# Patient Record
Sex: FEMALE | Race: WHITE | NOT HISPANIC OR LATINO | Employment: FULL TIME | URBAN - METROPOLITAN AREA
[De-identification: names, ages, dates, MRNs, and addresses within clinical notes are randomized per-mention and may not be internally consistent; named-entity substitution may affect disease eponyms.]

---

## 2023-02-21 LAB — ESTRADIOL (PG/ML) IN SER/PLAS: 31 PG/ML

## 2023-02-26 LAB — ESTRADIOL (PG/ML) IN SER/PLAS: 104 PG/ML

## 2023-02-28 LAB — ESTRADIOL (PG/ML) IN SER/PLAS: 164 PG/ML

## 2023-03-02 LAB
ESTRADIOL (PG/ML) IN SER/PLAS: 388 PG/ML
PROGESTERONE (NG/ML) IN SER/PLAS: 0.6 NG/ML

## 2023-03-04 LAB
ESTRADIOL (PG/ML) IN SER/PLAS: 1006 PG/ML
PROGESTERONE (NG/ML) IN SER/PLAS: 0.3 NG/ML

## 2023-03-06 LAB
ESTRADIOL (PG/ML) IN SER/PLAS: 1473 PG/ML
PROGESTERONE (NG/ML) IN SER/PLAS: 0.6 NG/ML

## 2023-03-08 LAB
ESTRADIOL (PG/ML) IN SER/PLAS: 2915 PG/ML
PROGESTERONE (NG/ML) IN SER/PLAS: 0.6 NG/ML

## 2023-03-09 LAB
LUTEINIZING HORMONE (IU/ML) IN SER/PLAS: 43.8 IU/L
PROGESTERONE (NG/ML) IN SER/PLAS: 2.7 NG/ML

## 2023-03-28 LAB
ESTRADIOL (PG/ML) IN SER/PLAS: 278 PG/ML
PROGESTERONE (NG/ML) IN SER/PLAS: 0.3 NG/ML

## 2023-04-04 LAB — PROGESTERONE (NG/ML) IN SER/PLAS: 27.7 NG/ML

## 2023-04-14 LAB — CHORIOGONADOTROPIN (MIU/ML) IN SER/PLAS: 84 MIU/ML

## 2023-04-18 LAB — CHORIOGONADOTROPIN (MIU/ML) IN SER/PLAS: 454 MIU/ML

## 2023-04-25 LAB — CHORIOGONADOTROPIN (MIU/ML) IN SER/PLAS: 4069 MIU/ML

## 2023-05-17 LAB
ABO GROUP (TYPE) IN BLOOD: NORMAL
ANTIBODY SCREEN: NORMAL
CHORIOGONADOTROPIN (MIU/ML) IN SER/PLAS: ABNORMAL MIU/ML
ERYTHROCYTE DISTRIBUTION WIDTH (RATIO) BY AUTOMATED COUNT: 13.6 % (ref 11.5–14.5)
ERYTHROCYTE MEAN CORPUSCULAR HEMOGLOBIN CONCENTRATION (G/DL) BY AUTOMATED: 32.4 G/DL (ref 32–36)
ERYTHROCYTE MEAN CORPUSCULAR VOLUME (FL) BY AUTOMATED COUNT: 85 FL (ref 80–100)
ERYTHROCYTES (10*6/UL) IN BLOOD BY AUTOMATED COUNT: 4.62 X10E12/L (ref 4–5.2)
HEMATOCRIT (%) IN BLOOD BY AUTOMATED COUNT: 39.2 % (ref 36–46)
HEMOGLOBIN (G/DL) IN BLOOD: 12.7 G/DL (ref 12–16)
LEUKOCYTES (10*3/UL) IN BLOOD BY AUTOMATED COUNT: 13.8 X10E9/L (ref 4.4–11.3)
PLATELETS (10*3/UL) IN BLOOD AUTOMATED COUNT: 326 X10E9/L (ref 150–450)
RH FACTOR: NORMAL

## 2023-05-25 LAB — CHORIOGONADOTROPIN (MIU/ML) IN SER/PLAS: 292 IU/L

## 2023-07-25 LAB
ESTRADIOL (PG/ML) IN SER/PLAS: 99 PG/ML
HEPATITIS B VIRUS SURFACE AG PRESENCE IN SERUM: NONREACTIVE
HEPATITIS C VIRUS AB PRESENCE IN SERUM: NONREACTIVE
HIV 1/ 2 AG/AB SCREEN: NONREACTIVE
LUTEINIZING HORMONE (IU/ML) IN SER/PLAS: 5.9 IU/L
PROGESTERONE (NG/ML) IN SER/PLAS: 0.5 NG/ML
SYPHILIS TOTAL AB: NONREACTIVE

## 2023-07-26 LAB
CHLAMYDIA TRACH., AMPLIFIED: NEGATIVE
N. GONORRHEA, AMPLIFIED: NEGATIVE

## 2023-08-24 LAB
ESTRADIOL (PG/ML) IN SER/PLAS: 168 PG/ML
LUTEINIZING HORMONE (IU/ML) IN SER/PLAS: 14.4 IU/L
PROGESTERONE (NG/ML) IN SER/PLAS: 0.7 NG/ML

## 2023-10-23 PROBLEM — O09.519 ADVANCED MATERNAL AGE, PRIMIGRAVIDA (HHS-HCC): Status: ACTIVE | Noted: 2023-10-23

## 2023-10-23 PROBLEM — E66.812 CLASS 2 OBESITY WITH BODY MASS INDEX (BMI) OF 35.0 TO 35.9 IN ADULT: Status: ACTIVE | Noted: 2023-10-23

## 2023-10-23 PROBLEM — E28.2 PCOS (POLYCYSTIC OVARIAN SYNDROME): Status: ACTIVE | Noted: 2023-10-23

## 2023-10-23 PROBLEM — N92.6 IRREGULAR MENSES: Status: ACTIVE | Noted: 2023-10-23

## 2023-10-23 PROBLEM — N97.9 FEMALE INFERTILITY: Status: ACTIVE | Noted: 2023-10-23

## 2023-10-23 PROBLEM — N80.9 ENDOMETRIOSIS: Status: ACTIVE | Noted: 2023-10-23

## 2023-10-23 PROBLEM — E66.9 CLASS 2 OBESITY WITH BODY MASS INDEX (BMI) OF 35.0 TO 35.9 IN ADULT: Status: ACTIVE | Noted: 2023-10-23

## 2023-10-23 PROBLEM — E11.9 DIABETES (MULTI): Status: ACTIVE | Noted: 2023-10-23

## 2023-10-23 PROBLEM — R73.09 ELEVATED HEMOGLOBIN A1C: Status: ACTIVE | Noted: 2023-10-23

## 2023-10-23 PROBLEM — I10 CHRONIC HYPERTENSION: Status: ACTIVE | Noted: 2023-10-23

## 2023-10-23 RX ORDER — PNV 119/IRON FUM/FOLIC ACID 29 MG-1 MG
TABLET ORAL
COMMUNITY
Start: 2022-08-01

## 2023-10-23 RX ORDER — MEGESTROL ACETATE 40 MG/1
TABLET ORAL
COMMUNITY
Start: 2022-08-01 | End: 2024-04-09 | Stop reason: ALTCHOICE

## 2023-10-23 RX ORDER — VITAMIN E (DL,TOCOPHERYL ACET) 90 MG
CAPSULE ORAL
COMMUNITY
Start: 2022-08-01

## 2023-10-23 RX ORDER — VIT C/E/ZN/COPPR/LUTEIN/ZEAXAN 250MG-90MG
CAPSULE ORAL
COMMUNITY
Start: 2022-08-01

## 2023-10-23 RX ORDER — LORATADINE AND PSEUDOEPHEDRINE SULFATE 5; 120 MG/1; MG/1
TABLET, EXTENDED RELEASE ORAL
COMMUNITY
Start: 2022-08-01

## 2023-10-23 RX ORDER — NORETHINDRONE 5 MG/1
1 TABLET ORAL DAILY
COMMUNITY
Start: 2022-10-06

## 2023-10-23 RX ORDER — METFORMIN HYDROCHLORIDE 750 MG/1
1 TABLET, EXTENDED RELEASE ORAL 2 TIMES DAILY
COMMUNITY
Start: 2022-02-07

## 2023-10-23 RX ORDER — ALBUTEROL SULFATE 90 UG/1
1-2 AEROSOL, METERED RESPIRATORY (INHALATION)
COMMUNITY
Start: 2022-08-01

## 2023-10-23 RX ORDER — NORGESTIMATE AND ETHINYL ESTRADIOL 0.25-0.035
KIT ORAL
COMMUNITY
Start: 2022-09-07 | End: 2024-04-09 | Stop reason: ALTCHOICE

## 2023-10-23 RX ORDER — PROGESTERONE 100 MG/1
100 CAPSULE ORAL 2 TIMES DAILY
COMMUNITY
End: 2024-04-09 | Stop reason: ALTCHOICE

## 2023-10-23 RX ORDER — DEXTROMETHORPHAN HYDROBROMIDE, GUAIFENESIN 5; 100 MG/5ML; MG/5ML
LIQUID ORAL
COMMUNITY
Start: 2022-10-06

## 2023-10-23 RX ORDER — LETROZOLE 2.5 MG/1
2.5 TABLET, FILM COATED ORAL DAILY
COMMUNITY
Start: 2022-02-07 | End: 2024-04-09 | Stop reason: ALTCHOICE

## 2023-10-23 RX ORDER — PHENYLEPHRINE HCL 10 MG
TABLET ORAL
COMMUNITY
Start: 2022-08-01

## 2023-10-23 RX ORDER — SPIRONOLACTONE 50 MG/1
TABLET, FILM COATED ORAL
COMMUNITY
Start: 2021-10-05 | End: 2024-04-09 | Stop reason: ALTCHOICE

## 2023-10-25 ENCOUNTER — APPOINTMENT (OUTPATIENT)
Dept: INTEGRATIVE MEDICINE | Facility: CLINIC | Age: 38
End: 2023-10-25

## 2023-11-01 ENCOUNTER — APPOINTMENT (OUTPATIENT)
Dept: INTEGRATIVE MEDICINE | Facility: CLINIC | Age: 38
End: 2023-11-01

## 2023-11-08 ENCOUNTER — APPOINTMENT (OUTPATIENT)
Dept: INTEGRATIVE MEDICINE | Facility: CLINIC | Age: 38
End: 2023-11-08

## 2024-04-09 ENCOUNTER — TELEPHONE (OUTPATIENT)
Dept: ENDOCRINOLOGY | Facility: CLINIC | Age: 39
End: 2024-04-09

## 2024-04-09 ENCOUNTER — TELEMEDICINE (OUTPATIENT)
Dept: ENDOCRINOLOGY | Facility: CLINIC | Age: 39
End: 2024-04-09
Payer: COMMERCIAL

## 2024-04-09 VITALS — WEIGHT: 243 LBS | HEIGHT: 67 IN | BODY MASS INDEX: 38.14 KG/M2

## 2024-04-09 DIAGNOSIS — Z13.29 SCREENING FOR THYROID DISORDER: ICD-10-CM

## 2024-04-09 DIAGNOSIS — N93.9 ABNORMAL UTERINE BLEEDING (AUB): ICD-10-CM

## 2024-04-09 DIAGNOSIS — E28.2 PCOS (POLYCYSTIC OVARIAN SYNDROME): ICD-10-CM

## 2024-04-09 DIAGNOSIS — N97.0 INFERTILITY, ANOVULATION: Primary | ICD-10-CM

## 2024-04-09 DIAGNOSIS — Z01.83 ENCOUNTER FOR RH BLOOD TYPING: ICD-10-CM

## 2024-04-09 DIAGNOSIS — Z11.3 SCREENING FOR STDS (SEXUALLY TRANSMITTED DISEASES): ICD-10-CM

## 2024-04-09 DIAGNOSIS — E11.9 TYPE 2 DIABETES MELLITUS WITHOUT COMPLICATION, WITHOUT LONG-TERM CURRENT USE OF INSULIN (MULTI): ICD-10-CM

## 2024-04-09 DIAGNOSIS — Z31.41 FERTILITY TESTING: ICD-10-CM

## 2024-04-09 PROCEDURE — 99214 OFFICE O/P EST MOD 30 MIN: CPT

## 2024-04-09 PROCEDURE — 1036F TOBACCO NON-USER: CPT

## 2024-04-09 RX ORDER — TOPIRAMATE SPINKLE 25 MG/1
25 CAPSULE ORAL 2 TIMES DAILY
COMMUNITY

## 2024-04-09 ASSESSMENT — PATIENT HEALTH QUESTIONNAIRE - PHQ9
SUM OF ALL RESPONSES TO PHQ9 QUESTIONS 1 AND 2: 0
2. FEELING DOWN, DEPRESSED OR HOPELESS: NOT AT ALL
1. LITTLE INTEREST OR PLEASURE IN DOING THINGS: NOT AT ALL

## 2024-04-09 ASSESSMENT — PAIN SCALES - GENERAL: PAINLEVEL: 5

## 2024-04-09 NOTE — TELEPHONE ENCOUNTER
Reason for call: Order Needed  Notes: Pt had a virtual visit with Kathie today and called to schedule pelvic US per her instruction. There is no order placed. Pt would ideally like to come in tomorrow.

## 2024-04-09 NOTE — PROGRESS NOTES
Virtual or Telephone Consent: An interactive audio and video telecommunication system which permits real time communications between the patient (at the originating site) and provider (at the distant site) was utilized to provide this telehealth service    FERTILITY PATIENT FOLLOW UP VISIT    Accompanied today by:      Danyelle Cano is a 38 y.o.  female who presents to discuss treatment options, H/o endometriosis, PCOS, AUB, Fibroid uterus, & Type 2 DM, S/P IVF 3-2023 with 1 blast frozen/FET 2023 resulting in miscarriage/IPAS/Trisomy 16 on 2023 followed by 2 unsuccessful cycles of Letrozole 5 mg/Monitoring/HCG Trigger/IUI with LP P4 support  & . Recent diagnosis of Migraines with Aura. Partner with male factor infertility: oligospermia & pyospermia, last saw Dr. RAMOS 2023.    PRIOR EVALUATION / TREATMENT  Letrozole 5 mg days 3-7/monitoring/HCG Trigger/IUI with LP P4 support x 2023 & 2023  Programmed FET Estrace 6 mg daily/CASSIE 75 mg IM- trilaminar 10.4 mm: 2023 1 blast- miscarriage with IPAS in IVF MS on 2023- trisomy 16 of maternal origin   IVF x 1: //[facility]- Egg Retrieval 3-: 3 eggs retrieved left ovary, 0 egg from right ovary- 1 blast frozen  MFM consult 2023- cleared by Dr. Doe  SIS 2022- normal    PRIOR EVALUATION / TREATMENT  Full Fertility work up at [facility] in 2018 (per pt, no records for review today)  2 cycles of Letrozole 5 mg with u/s monitoring and hcg trigger with partner IUI at [facility], without conception   Hycosy (SIS bubble test) per pt WNL, bilateral tubal patency  D&C 2022 for abnormal uterine bleeding, polypectomy at time of D&C, endometrial biopsy normal- Treated with Megace 40 mg daily for AUB in     Prior Labs  Lab Results    Date Done      AMH: 2.15 (Ref range: ng/mL) 2022   TSH: 1.75 (Ref range: 0.44 - 3.98 mIU/L) 2022   PRL: 15.0 (Ref range: 3.0 - 20.0 ug/L) 2022   Testosterone: No results  found for requested labs within last 1825 days. No results found for requested labs within last 1825 days.   DHEAS: 55 (Ref range: 12 - 379 ug/dL) 2022   FSH: No results found for requested labs within last 1825 days. No results found for requested labs within last 1825 days.   17 OHP: No results found for requested labs within last 1825 days. No results found for requested labs within last 1825 days.   HgbA1c: 6.1 with PCP [facility] - will fax me result   Hepatitis B surface antigen: NONREACTIVE (Ref range: NONREACTIVE) 2023   Hepatitis C antibody: NONREACTIVE (Ref range: NONREACTIVE) 2023   HIV ½ Antigen Antibody screen with reflex: NONREACTIVE (Ref range: NONREACTIVE) 2023   Syphilis screening with reflex: No results found for requested labs within last 1825 days. 2023   GC: NEGATIVE (Ref range: Negative) 2023   CT: NEGATIVE (Ref range: Negative) 2023   Type and Screen: O 2023   Rh: NEG 2023   Antibody: negative 2023   Rubella: POSITIVE (Ref range: ) 2022   Varicella: POSITIVE (Ref range: NEGATIVE) 2022   Hemoglobin: No results found for requested labs within last 1825 days. No results found for requested labs within last 1825 days.   Hematocrit: No results found for requested labs within last 1825 days. No results found for requested labs within last 1825 days.   Creatinine: 0.75 (Ref range: 0.50 - 1.05 mg/dL) 2023   AST:12 (Ref range: 9 - 39 U/L) 2023   ALT:13 (Ref range: 7 - 45 U/L): 2023      Relationship Status:       OB Hx:   SAB from 1 Blast FET with IPAS on 2023 with Trisomy 16 with Anora testing     OB History          1    Para   0    Term   0       0    AB   0    Living   0         SAB   0    IAB   0    Ectopic   0    Multiple   0    Live Births   0               GYN HISTORY   Have you ever been diagnosed with a sexually transmitted disease?  no  Please select all that are applicable:   "  Have you ever had Pelvic Inflammatory Disease?  no  Have you had an abnormal PAP smear?  no  Date & Result of last PAP smear: 6-7-2021- negative- result scanned in  Have you ever had an abnormal Mammogram?  no  Date & result of your last mammogram:  NEEDS MAMMOGRAM- order is in and she will do  Do you have pelvic pain?  yes, worse- daily- with intermenstrual bleeding- HMB and clots  How many times per week do you have intercourse?  NA  Do you have pain with intercourse?  Yes- deep penetration  Do you use lubricants with intercourse?  -  Do you have pain with bowel movements?    Yes, occ          Do you have pain with a full bladder?  Yes, occ  MENSTRUAL HISTORY  LMP:  4-2-2024  Menarche:  12  Contraception:  COCPs age [age]  Cycle length:  off & on bleeding past couple of months   Describe your bleeding:  heavy  Dysmenorrhea:  yes, first few days of menses pt described \"bad cramps\" uses heating pads and ibuprofen      ENDOCRINE/INFERTILITY HISTORY  Duration of infertility:  6.5 yrs  Coital Activity/week:  NA  Nipple Discharge:  no  Vision changes:  with onset migraine  Headaches:  migraine HA's  Excess hair growth:  yes- increased on chin  Excessive hair loss:  no  Acne:  yes  Oily skin:  yes  Recent weight change  Weight gain:  no  Weight loss:  no  Exercise more than 3 times a week:  yes    PMH: PMH: Asthma, sinusitis (allergy related), PCOS, Type 2 DM, endometriosis, CHTN, fibroids, chronic HA's/migraines HA's with Aura  Past Medical History:   Diagnosis Date    Migraine headache with aura     Other seasonal allergic rhinitis     Seasonal allergies    Personal history of other benign neoplasm     History of uterine leiomyoma    Personal history of other diseases of the female genital tract     History of endometriosis    Personal history of other diseases of the respiratory system     History of chronic rhinitis    Personal history of other diseases of the respiratory system 10/06/2022    History of asthma    " Polyp of corpus uteri     Endometrial polyp        MEDICATIONS  Current Outpatient Medications on File Prior to Visit   Medication Sig Dispense Refill    acetaminophen (Tylenol Arthritis Pain) 650 mg ER tablet Tylenol 8 Hour 650 MG Oral Tablet Extended Release   Refills: 0        Start : 6-Oct-2022   Active      albuterol 90 mcg/actuation inhaler Inhale 1-2 puffs.  INHALE 1 TO 2 PUFFS BY MOUTH EVERY 4 TO 6 HOURS AS NEEDED      cholecalciferol (Vitamin D-3) 25 MCG (1000 UT) capsule Vitamin D-3 25 MCG (1000 UT) Oral Capsule   Refills: 0        Start : 1-Aug-2022   Active      Cinnamon 500 mg capsule Cinnamon 500 MG Oral Capsule   Refills: 0        Start : 1-Aug-2022   Active      coenzyme Q10 400 mg capsule CoQ10 400 MG Oral Capsule   Refills: 0        Start : 1-Aug-2022   Active      loratadine-pseudoephedrine (Claritin-D 12 Hour) 5-120 mg 12 hr tablet Claritin-D 12 Hour 5-120 MG Oral Tablet Extended Release 12 Hour   Refills: 0        Start : 1-Aug-2022   Active      metFORMIN XR (Glucophage-XR) 750 mg 24 hr tablet Take 1 tablet (750 mg) by mouth 2 times a day.      norethindrone (Aygestin) 5 mg tablet Take 1 tablet (5 mg) by mouth once daily.      -iron fum-folic acid (Prenatal 19) 29 mg iron- 1 mg tablet Prenatal 19 Oral Tablet   Refills: 0        Start : 1-Aug-2022   Active      topiramate (Topamax Sprinkle) 25 mg capsule Take 1 capsule (25 mg) by mouth 2 times a day. Do not crush or chew.      [DISCONTINUED] chorionic gonadotropin (Pregnyl) 10,000 unit injection RECONSTITUTE ACCORDING TO INSTRUCTIONS AND INJECT 10,000 UNITS (1 ML) INTO THE MUSCLE AS A ONE TIME DOSE, AS DIRECTED PER PROVIDER FOR TRIGGER.. (Patient not taking: Reported on 4/9/2024) 1 each 1    [DISCONTINUED] estradiol (Estrace) 2 mg tablet TAKE THREE (3) TABLETS BY MOUTH DAILY STARTING ON CYCLE DAY 1 90 tablet 3    [DISCONTINUED] letrozole (Femara) 2.5 mg tablet Take 1 tablet (2.5 mg total) by mouth once daily.  FOR 5 DAYS STARTING ON CYCLE  DAY 2 OR 3      [DISCONTINUED] megestrol (Megace) 40 mg tablet Megestrol Acetate 40 MG Oral Tablet   Refills: 0        Start : 1-Aug-2022   Active      [DISCONTINUED] norgestimate-ethinyl estradioL (Sprintec, 28,) 0.25-35 mg-mcg tablet Sprintec 28 0.25-35 MG-MCG Oral Tablet   Quantity: 28  Refills: 0        Start : 7-Sep-2022   Active      [DISCONTINUED] progesterone (Prometrium) 100 mg capsule Insert 1 capsule (100 mg) into the vagina 2 times a day. Start 3 days after intrauterine insemination      [DISCONTINUED] spironolactone (Aldactone) 50 mg tablet Spironolactone 50 MG Oral Tablet   Quantity: 90  Refills: 0        Start : 5-Oct-2021   Active       No current facility-administered medications on file prior to visit.        PSH: D&C x 2 for abnormal uterine bleeding, Laparoscopy was diagnosed with endometriosis (Dr. Acosta in [place]), IPAS, IVF x 1  Past Surgical History:   Procedure Laterality Date    OTHER SURGICAL HISTORY  2022    Laparoscopy    OTHER SURGICAL HISTORY  2022    Hysteroscopy    OTHER SURGICAL HISTORY  2022    Dilation and curettage    OTHER SURGICAL HISTORY  2022    Tonsillectomy    OTHER SURGICAL HISTORY  2022    Nasal septoplasty      PSYCH HISTORY: none       SOCIAL HISTORY  Social History     Tobacco Use    Smoking status: Never     Passive exposure: Never    Smokeless tobacco: Never   Substance Use Topics    Alcohol use: Yes    Drug use: Never     Occupation:  RN  Have you ever been incarcerated?  no  Do you have a history of domestic violence?  no  Do you feel safe at home?  yes  Do you have a history of any negative sexual experience such as incest or rape? no      PARTNER HISTORY  Partner Name:  Sancho Cano   :  3/30/[birth year], 40 yrs old  Occupation:  sales for [employer]   Prior fertility history:  none, 1 miscarriage with this partner   PMH:  no  PSH:  no  Smoking: no  Alcohol Use:  no  Drug Use:  no  Medications:  no  Injuries:  no  STD:  negative  "2023, due repeat 2024  Please select all that are applicable:    SA:  yes RGI, overall normal per pt, ? low motility & with us for IUI & IVF  SA Results:  recent IUIs:  2023: conc 14.38, mot 48%, TMS 3.4 million  2023: mot 73%, TMS 1.6 million  Last saw Dr. RAMOS 2023- oligospermia & pyospermia    FAMILY HISTORY  Family History   Problem Relation Name Age of Onset    No Known Problems Mother      No Known Problems Father         CANCER HISTORY    Breast:  no  Ovarian:  no  Colon:  no  Endometrial:  no    FAMILY VTE HISTORY  Family History of Blood Clots:  no    GENETIC HISTORY  Ethnic Background  Patient:  Mixed    Partner:  Mixed    Genetic Disease in Family  Patient: no   Partner:  no  Birth Defects in Family  Patient:  no  Partner:  no  Genetic screening performed previously:  yes had [facility], do not have results, but remembers results being normal      BMI:   BMI Readings from Last 1 Encounters:   24 38.06 kg/m²     VITALS:  Ht 1.702 m (5' 7\")   Wt 110 kg (243 lb)   LMP 2024 (Exact Date)   BMI 38.06 kg/m²     ASSESSMENT   38 y.o.  female with h/o infertility x 6.5 yrs, suspected oligoovulation and the following pertinent medical issues: AUB, Asthma, sinusitis (allergy related), PCOS, Type 2 DM, endometriosis, CHTN, fibroids, migraines HA's with Aura. Desires IVF with PGT-A if uses own eggs vs egg donation.    Partner SA: Oligospermia/pyospermia    COUNSELING  We discussed causes of infertility including hormonal, egg quality issues, structural problems such as endometriosis, adhesions, or tubal problems, uterine factors such as polyps or fibroids, and sperm issues. Reviewed evaluation of such as well. We discussed various methods for achieving pregnancy in some detail including, ovulation induction, insemination, superovulation and IVF.    We discussed diagnosis of PCOS and implications for fertility and long-term " "health including risks of endometrial hyperplasia, obesity, diabetes and cardiovascular disease. Discussed diet and exercise are first-line treatments for PCOS. Medical management may be indicated, particularly for glucose intolerance if that is found in testing. Ovulation induction is the primary treatment for fertility.    Discussed the importance of diet and exercise as first-line treatments for PCOS and particularly the importance of a low-glycemic index diet that emphasizes whole grains, vegetables and fruits, and protein sources while minimizing sugar and processed foods. Discussed that even a small amount of weight loss can have an effect on the symptoms of PCOS and is important for long-term health outcomes.      We discussed the impact of age on fertility. We discussed that a woman is born with all of the follicles that she will have in her lifetime and that these numbers progressively decrease as the patient reaches menopause. We discussed that women can remain fertile into their late 30’s and even early 40’s, however, chance for success is significantly lower for women who have infertility. We also discussed the higher rates of aneuploidy and miscarriage that occur as women age.  We discussed AMH testing and the patient's AMH level, if applicable.  AMH is considered favorable if >1 however this test has many limitations including poor predictive rates of pregnancy. In randomized control trials such as \"Time to conceive\" pts with low AMH levels (<0.7) has similar cumulative pregnancy rates compared with women that had normal AMH levels.  In the \"AMIGOS\" study, AMH did not predict pregnancy rates following OS/IUI for unexplained infertility. However,  AMH is a marker that is helpful to predict how a patient may respond or oocyte yields with FSH and ART treatments, but again there is conflicting data about how this affects pregnancy rates with ART.    Routine Testing  Fertility Center  STDs Within 1 year "   Genetic carrier Waiver/Completed   T&S Within 1 year   AMH Within 1 year   TSH Within 1 year   Rubella/Varicella Within 5 years     BMI Testing  Fertility Center  CBC Within 1 year   CMP Within 1 year   HgbA1c Within 1 year   Mag, Phos, Vit D <18 Within 1 year   MFM > 40  REQ   Wt loss consult > 40 OPT     PLAN: PATIENT:  Schedule IVF consult With Third Party with HARLEY Garcia CNP for poss egg donor, if uses own eggs will do PGT-A  Schedule FUV with attending for AUB  Needs Mammogram- has order and will do  CBC  CMP  Lipid Panel  AMH  TSH  HgbA1C will fax result- done 2-2024- 6.1%  T&S  Declines [facility]- needs to sign waiver if has not done  Gyn Pelvic Ultrasound- will try to get this week and if not to call back next menses and schedule- get an EMB if lining thickened. H/O AUB, fibroids, & polyps- may also need SIS  Follow up with MD   Hysteroscopy if plans IVF with PGT-A/and or egg donor  STDs due 7-2023, order now  Chart to primary nurse for care coordination and patient check list/education, Tammy is your nurse  Enroll in Engaged MD  Take prenatal vitamins, vitamin D 2000 IUs daily  Discussed that PAP and mammogram must be updated if appropriate based on age and clinical history and results received before treatment can begin- up to date for pap test, needs MAMMOGRAM  Discussed that treatment cannot proceed until checklist items are complete     PARTNER:   Follow up with DR. RACHEL Sanabria SA  STDs not due until 7-        MD Completion:  Ectopic Risk: Yes- h/o endometriosis  Medically Complex: Yes- PMH: Asthma, sinusitis (allergy related), PCOS, Type 2 DM, endometriosis, CHTN, fibroids, chronic HA's/Migraines with Aura      Kathie Magdaleno CNP 04/09/24 12:56 PM

## 2024-04-09 NOTE — Clinical Note
Murali hyatt, I saw Danyelle today- will eventually do IVF with PGT-S vs egg donor, also with AUB- see my note, it is lengthy, Kathie

## 2024-04-17 ENCOUNTER — ANCILLARY PROCEDURE (OUTPATIENT)
Dept: ENDOCRINOLOGY | Facility: CLINIC | Age: 39
End: 2024-04-17
Payer: COMMERCIAL

## 2024-04-17 DIAGNOSIS — Z11.3 SCREENING FOR STDS (SEXUALLY TRANSMITTED DISEASES): ICD-10-CM

## 2024-04-17 DIAGNOSIS — Z13.29 SCREENING FOR THYROID DISORDER: ICD-10-CM

## 2024-04-17 DIAGNOSIS — E28.2 PCOS (POLYCYSTIC OVARIAN SYNDROME): ICD-10-CM

## 2024-04-17 DIAGNOSIS — N93.9 ABNORMAL UTERINE BLEEDING (AUB): ICD-10-CM

## 2024-04-17 DIAGNOSIS — Z01.83 ENCOUNTER FOR RH BLOOD TYPING: ICD-10-CM

## 2024-04-17 DIAGNOSIS — E11.9 TYPE 2 DIABETES MELLITUS WITHOUT COMPLICATION, WITHOUT LONG-TERM CURRENT USE OF INSULIN (MULTI): ICD-10-CM

## 2024-04-17 DIAGNOSIS — Z31.41 FERTILITY TESTING: ICD-10-CM

## 2024-04-17 LAB
ABO GROUP (TYPE) IN BLOOD: NORMAL
ALBUMIN SERPL BCP-MCNC: 4.1 G/DL (ref 3.4–5)
ALP SERPL-CCNC: 66 U/L (ref 33–110)
ALT SERPL W P-5'-P-CCNC: 14 U/L (ref 7–45)
ANION GAP SERPL CALC-SCNC: 13 MMOL/L (ref 10–20)
ANTIBODY SCREEN: NORMAL
AST SERPL W P-5'-P-CCNC: 10 U/L (ref 9–39)
BILIRUB SERPL-MCNC: 0.3 MG/DL (ref 0–1.2)
BUN SERPL-MCNC: 12 MG/DL (ref 6–23)
CALCIUM SERPL-MCNC: 8.7 MG/DL (ref 8.6–10.3)
CHLORIDE SERPL-SCNC: 105 MMOL/L (ref 98–107)
CHOLEST SERPL-MCNC: 150 MG/DL (ref 0–199)
CHOLESTEROL/HDL RATIO: 3.6
CO2 SERPL-SCNC: 25 MMOL/L (ref 21–32)
CREAT SERPL-MCNC: 0.75 MG/DL (ref 0.5–1.05)
EGFRCR SERPLBLD CKD-EPI 2021: >90 ML/MIN/1.73M*2
ERYTHROCYTE [DISTWIDTH] IN BLOOD BY AUTOMATED COUNT: 13.2 % (ref 11.5–14.5)
GLUCOSE SERPL-MCNC: 99 MG/DL (ref 74–99)
HCT VFR BLD AUTO: 37 % (ref 36–46)
HDLC SERPL-MCNC: 41.9 MG/DL
HGB BLD-MCNC: 11.7 G/DL (ref 12–16)
LDLC SERPL CALC-MCNC: 82 MG/DL
MCH RBC QN AUTO: 26.7 PG (ref 26–34)
MCHC RBC AUTO-ENTMCNC: 31.6 G/DL (ref 32–36)
MCV RBC AUTO: 85 FL (ref 80–100)
NON HDL CHOLESTEROL: 108 MG/DL (ref 0–149)
NRBC BLD-RTO: 0 /100 WBCS (ref 0–0)
PLATELET # BLD AUTO: 283 X10*3/UL (ref 150–450)
POTASSIUM SERPL-SCNC: 3.8 MMOL/L (ref 3.5–5.3)
PROT SERPL-MCNC: 6.7 G/DL (ref 6.4–8.2)
RBC # BLD AUTO: 4.38 X10*6/UL (ref 4–5.2)
RH FACTOR (ANTIGEN D): NORMAL
SODIUM SERPL-SCNC: 139 MMOL/L (ref 136–145)
TRIGL SERPL-MCNC: 133 MG/DL (ref 0–149)
TSH SERPL-ACNC: 1.86 MIU/L (ref 0.44–3.98)
VLDL: 27 MG/DL (ref 0–40)
WBC # BLD AUTO: 10 X10*3/UL (ref 4.4–11.3)

## 2024-04-17 PROCEDURE — 86780 TREPONEMA PALLIDUM: CPT

## 2024-04-17 PROCEDURE — 86803 HEPATITIS C AB TEST: CPT

## 2024-04-17 PROCEDURE — 76830 TRANSVAGINAL US NON-OB: CPT

## 2024-04-17 PROCEDURE — 85027 COMPLETE CBC AUTOMATED: CPT

## 2024-04-17 PROCEDURE — 83516 IMMUNOASSAY NONANTIBODY: CPT

## 2024-04-17 PROCEDURE — 84443 ASSAY THYROID STIM HORMONE: CPT

## 2024-04-17 PROCEDURE — 86850 RBC ANTIBODY SCREEN: CPT

## 2024-04-17 PROCEDURE — 87389 HIV-1 AG W/HIV-1&-2 AB AG IA: CPT

## 2024-04-17 PROCEDURE — 36415 COLL VENOUS BLD VENIPUNCTURE: CPT

## 2024-04-17 PROCEDURE — 80061 LIPID PANEL: CPT

## 2024-04-17 PROCEDURE — 86901 BLOOD TYPING SEROLOGIC RH(D): CPT

## 2024-04-17 PROCEDURE — 86900 BLOOD TYPING SEROLOGIC ABO: CPT

## 2024-04-17 PROCEDURE — 87800 DETECT AGNT MULT DNA DIREC: CPT

## 2024-04-17 PROCEDURE — 80053 COMPREHEN METABOLIC PANEL: CPT

## 2024-04-17 PROCEDURE — 87340 HEPATITIS B SURFACE AG IA: CPT

## 2024-04-18 LAB
C TRACH RRNA SPEC QL NAA+PROBE: NEGATIVE
HBV SURFACE AG SERPL QL IA: NONREACTIVE
HCV AB SER QL: NONREACTIVE
HIV 1+2 AB+HIV1 P24 AG SERPL QL IA: NONREACTIVE
N GONORRHOEA DNA SPEC QL PROBE+SIG AMP: NEGATIVE
TREPONEMA PALLIDUM IGG+IGM AB [PRESENCE] IN SERUM OR PLASMA BY IMMUNOASSAY: NONREACTIVE

## 2024-04-20 LAB — MIS SERPL-MCNC: 1.75 NG/ML

## 2024-06-17 ENCOUNTER — CONSULT (OUTPATIENT)
Dept: ENDOCRINOLOGY | Facility: CLINIC | Age: 39
End: 2024-06-17
Payer: COMMERCIAL

## 2024-06-17 VITALS
HEART RATE: 102 BPM | SYSTOLIC BLOOD PRESSURE: 119 MMHG | HEIGHT: 67 IN | WEIGHT: 239 LBS | BODY MASS INDEX: 37.51 KG/M2 | DIASTOLIC BLOOD PRESSURE: 79 MMHG

## 2024-06-17 DIAGNOSIS — Z31.89 ENCOUNTER FOR FERTILITY PLANNING: ICD-10-CM

## 2024-06-17 DIAGNOSIS — Z31.69 ENCOUNTER FOR PRECONCEPTION CONSULTATION: Primary | ICD-10-CM

## 2024-06-17 DIAGNOSIS — N97.9 FEMALE INFERTILITY: ICD-10-CM

## 2024-06-17 PROCEDURE — 99215 OFFICE O/P EST HI 40 MIN: CPT | Performed by: NURSE PRACTITIONER

## 2024-06-17 PROCEDURE — 99417 PROLNG OP E/M EACH 15 MIN: CPT | Performed by: NURSE PRACTITIONER

## 2024-06-17 ASSESSMENT — PAIN SCALES - GENERAL: PAINLEVEL: 2

## 2024-06-17 NOTE — PROGRESS NOTES
"  Visit Type: In Person    Intended Parent using Oocyte Donor    Patient is a 39 y.o.  female with a history of infertility for 6 years.  Referred by:  Patient of Dr. Castro   Here today with: Spouse  Indication for IVF using Oocyte Donor: History of Endometriosis and Polycystic Ovarian Syndrome  Past Infertility Treatments: See MARY Cycle tab    OB Hx  Conceived on FET of 1 Blast   2023 pt conceived on FET of 1 Blast, Ultrasound at [facility] demonstrated Missed AB, D&C, ANORA demonstrated Trisomy. No complications.     GYN HISTORY   Dyspareunia/Dyschezia/Dysuria: No  Pelvic pain: Yes  STDs: None  HX of abnormal Pap: No  HX of abnormal Mammo: No  LMP: Patient's last menstrual period was 2024 (exact date).  Menstrual cycles: Irregular  Pap smear: No results found for: \"PAP\"   HPV: No results found for requested labs within last 1825 days.    Mammogram:      Previous HPI obtained by Dr. Castro, verified today:  Indication for IVF: PCOS, endometriosis, male factor (oligospermia)  -Oligomenorrhea, clinical hirsutism (does not meet PCO ovarian criteria). Does have DM2- HgA1C 7.7%, recently started metformin 1500 mg XL daily  -Also has fibroids and AUB (heavy bleeding)     Length of infertility: 4 years     Ovarian reserve testing:     AMH: 2.15  AFC: Not measured yet     Status of fallopian tubes: Hycosy (SIS bubble test) per pt WNL, bilateral tubal patency (at [facility])     Uterine Cavity Evaluation:  TVUS  [facility] [place]  -Uterus 6.2x6.3x4.7 cm  2 fibroids noted:  2.1x2.2x2.4 cm posterior  2.2x1.6x1.9 cm anterior  EE 19.4 mm  ROV 2.4x.28x2.8 cm, with cyst 2.0x2.0x2.2 cm  LOV 3.1x3.02.0 cm     -Had hysteroscopy D&C polypectomy at [facility] 2022  Findings consistent with endometrial polyp  -Started on Megace after due to thickened lining and heavy bleeding--has since stopped  -Pathology reviewed: Endometrial polyp; disordered proliferative endometrium     Trial transfer: Easy IUIs " previously     Past Infertility Treatments:  3 cycles of letrozole/TIC  2 cycles of Letrozole 5 mg with u/s monitoring and hcg trigger with partner IUI at I, without conception      -Has had laparoscopy in the past for pelvic pain (); did find stage I endometriosis at that time     GYN Hx:  LMP: 2022  Menstrual cycles: Oligomenorrhea and AUB; heavy and painful bleeding, can be prolonged.   Pap smear: 2022; Normal   Mammogram: N/A      PMH/Surg Hx/Social Hx: See below  DM2- started on metformin  HTN- not diagnosed but BP's elevated  Obesity- BMI= 39, has lost 6 lbs recently since starting metformin     Hx of Clotting disorders: None     Genetic Screening Hx: Yes, did genetic screening with [facility] ([employer]); was told everything was normal     Partner History:  Name- Sancho LARKIN 3/30/82  Occupation- sales for [employer]   Prior fertility history: none  PMH: none  PSH: none  Smoking: none, never a smoker   Alcohol Use: none  Drug Use: none  Medications: none   Injuries /STDs: none  SA: yes [facility], overall normal per pt, ? low motility      Sperm freeze sample:  2.0 cc  11 mil/mL   44% motility  TMC 10 million  Round cells 6.9, with WBC stain POS  Morph 2.8%     2 vials frozen, 8.4 million frozen per vial.          PMH  Past Medical History:   Diagnosis Date    Migraine headache with aura     Other seasonal allergic rhinitis     Seasonal allergies    Personal history of other benign neoplasm     History of uterine leiomyoma    Personal history of other diseases of the female genital tract     History of endometriosis    Personal history of other diseases of the respiratory system     History of chronic rhinitis    Personal history of other diseases of the respiratory system 10/06/2022    History of asthma    Polyp of corpus uteri     Endometrial polyp     History of any clotting: No  History of hospitalizations or surgeries: No  History of easy bleeding/bruising: No    PSH  Past Surgical History:    Procedure Laterality Date    OTHER SURGICAL HISTORY  08/01/2022    Laparoscopy    OTHER SURGICAL HISTORY  08/01/2022    Hysteroscopy    OTHER SURGICAL HISTORY  08/01/2022    Dilation and curettage    OTHER SURGICAL HISTORY  08/01/2022    Tonsillectomy    OTHER SURGICAL HISTORY  08/01/2022    Nasal septoplasty       Genetic screening Hx  Patient Genetic Screening: [employer] 2bP:   Sperm Source: Declined, need to be sure waiver on file  Sperm Genetic Screening: [employer] 2bP:  Sperm Source Screening:Declined need to be sure waiver on file       Social history  Social History     Tobacco Use    Smoking status: Never     Passive exposure: Never    Smokeless tobacco: Never   Substance Use Topics    Alcohol use: Yes    Drug use: Never     Occupation:  RN  Current smoker: No    Family history  Cognitive deficits: No  Birth defects: No  Other:     Current Meds  Current Outpatient Medications on File Prior to Visit   Medication Sig Dispense Refill    acetaminophen (Tylenol Arthritis Pain) 650 mg ER tablet Tylenol 8 Hour 650 MG Oral Tablet Extended Release   Refills: 0        Start : 6-Oct-2022   Active      albuterol 90 mcg/actuation inhaler Inhale 1-2 puffs.  INHALE 1 TO 2 PUFFS BY MOUTH EVERY 4 TO 6 HOURS AS NEEDED      cholecalciferol (Vitamin D-3) 25 MCG (1000 UT) capsule Vitamin D-3 25 MCG (1000 UT) Oral Capsule   Refills: 0        Start : 1-Aug-2022   Active      Cinnamon 500 mg capsule Cinnamon 500 MG Oral Capsule   Refills: 0        Start : 1-Aug-2022   Active      coenzyme Q10 400 mg capsule CoQ10 400 MG Oral Capsule   Refills: 0        Start : 1-Aug-2022   Active      loratadine-pseudoephedrine (Claritin-D 12 Hour) 5-120 mg 12 hr tablet Claritin-D 12 Hour 5-120 MG Oral Tablet Extended Release 12 Hour   Refills: 0        Start : 1-Aug-2022   Active      metFORMIN XR (Glucophage-XR) 750 mg 24 hr tablet Take 1 tablet (750 mg) by mouth 2 times a day.      norethindrone (Aygestin) 5 mg tablet Take 1 tablet (5 mg) by mouth  "once daily.      -iron fum-folic acid (Prenatal 19) 29 mg iron- 1 mg tablet Prenatal 19 Oral Tablet   Refills: 0        Start : 1-Aug-2022   Active      topiramate (Topamax Sprinkle) 25 mg capsule Take 1 capsule (25 mg) by mouth 2 times a day. Do not crush or chew.       No current facility-administered medications on file prior to visit.       Allergies  Penicillins and Watermelon    Partner History  Partner Name:  Sancho Cano   :  [birth year], 40 yrs old  Occupation: sales for [employer]   SA in past year: Yes  SA:  yes [facility], overall normal per pt, ? low motility & with us for IUI & IVF  SA Results:  recent IUIs:  2023: conc 14.38, mot 48%, TMS 3.4 million  2023: mot 73%, TMS 1.6 million  Last saw Dr. RAMOS 2023- oligospermia & pyospermia  Updated SA May 2024  Volume (Semen)  1.5 mL 5.4   Concentration(Semen)  15 mill/mL 11.07 Abnormal    Total Motility (Semen)  40 % 47   Prog. Motility (Semen)  32 % 36   Non Prog. Motility (Semen)  % 12   Total No of Sperm (Semen)  39 mill 59.76   Total No of Motile (Semen)  mill 28.36   Total No of Rnd Cells (Semen)  5 mill 2.7   Leukocyte (Semen) Negative   % Normal (Semen)  4 % 3.5 Abnormal        VITALS:  /79 (BP Location: Right arm, Patient Position: Sitting, BP Cuff Size: Adult)   Pulse 102   Ht 1.702 m (5' 7\")   Wt 108 kg (239 lb)   LMP 2024 (Exact Date)   BMI 37.43 kg/m²   BMI:   BMI Readings from Last 1 Encounters:   24 37.43 kg/m²     BMI Testing  Fertility Center  CBC Within 1 year   CMP Within 1 year   HgbA1c Within 1 year   Mag, Phos, Vit D <18 Within 1 year   MFM > 40  REQ   Wt loss consult > 40 OPT     >45 y.o. Testing [facility]  CBC Within 1 year   CMP Within 1 year   HgbA1c Within 1 year   Lipid Panel Within 1 year   TSH Within 1 year   EKG Within 1 year   PCP/Internal Medicine Clearance OPT   MFM REQ   Colonoscopy > 50 OPT     ASSESSMENT   39 y.o.  female with  infertility x 6.5 years, and the following " pertinent medical issues: AUB, Asthma, sinusitis (allergy related), PCOS, Type 2 DM, endometriosis, CHTN, fibroids, migraines HA's with Aura .  Indication for IVF using Oocyte Donor: Diminished Ovarian [place]  Partner SA: Oligospermia    Discussed difference between directed donation vs unidentified donation including but not limited to; anonymity, identifiability, financial cost, donor agencies, donor compensation, potential for incomplete donor medical history background information, and legal contract differences.     Discussed unidentified donor oocytes from cryobank vs obtaining oocyte donor from agency to do fresh IVF stimulation including but not limited to; # of oocytes and blastocyst rates with fresh vs frozen oocytes, family planning (how many children the couple wants) financial cost differences, timeline, donor screenings (communicable diseases, FDA screenings, genetics), proven donors vs first time donors, and legal contracts.    We reviewed IVF with donor egg and discussed the following:    In-vitro fertilization and embryo transfer  Cryopreservation  Assessment of fertilization   Embryo cleavage  Statistics  ICSI/ Assisted hatching  Embryo transfer and preparation   Risks of multiple gestation  Dropped cycles  Selective reduction  Number of embryos to transfer  Ectopic pregnancy  Team of physicians  Informed consent procedure  Folic acid supplementation  Genetic screening of  (if not screened)  PGT-A/PGT-M  Possibility of getting only a small number of eggs/embryos  Need for adequate legal counseling  Possible loss of anonymity and/or confidentiality   Future Identifiably of oocyte donor by offspring  Differences between known/agency donors and donor egg espinosa    ART Cycle Plan    1. FET:  Protocol: Programmed  Type of donor cycle planned: egg bank  Adjuncts:  ASA 81 mg  Notes:      2. Insemination:  Sperm source: partner  Sperm collection method: Fresh with Frozen Backup  Notes:  ICSI: Yes  #  of oocytes to be fertilized: all    3. Transfer:   Number of embryos to replace:  1 Blast  Stage of embryo transfer: day 5  Trial transfer needed? No    4. Cryopreservation plan  PGT: No   Freeze all? Yes  Oocyte cryopreservation: No    5. Patient willing to accept blood transfusion: Yes    6. RN to review chart, initiate IP using oocyte donor boarding pass, and assure completion of the following prior to proceeding with IVF stimulation:       No orders of the defined types were placed in this encounter.      MFM consult   STDs (Hepatitis B, Hepatitis C, HIV, Syphilis, GC/CT) for patient and partner (if applicable) to be completed within the last year (z11.3)  Genetic carrier testing: waiver or carrier screen completed with clearance documentation by provider for both egg and sperm source (z13.71)  Rubella and varicella to be completed within the last five years (z11.59)   TSH to be completed within the last year (z13.29)  Type & Screen to be completed within the last year (z01.83)  Pre-IVF Imaging: Reference any orders placed by provider  Frozen sperm sample: ensure frozen partner sample (z31.41) or verify donor sperm on site prior to stimulation start date.  Verify in EMR or obtain copy of patient’s last mammogram (if applicable) and pap smear results for provider review in boarding pass.  Enroll in Engaged MD and complete annual consent forms for IVF using oocyte donor and cryotransport agreements.  BMI checklist for BMI <18 or >40  >45 year old testing, if applicable: CBC, CMP, HgbA1C, EKG   >50 year old testing, if applicable: Colonoscopy  Consults: Third Party Nursing, Financial and Third Party Psychology Consult: ensure completed prior to IVF baseline  Additional consults Financial consult and Reproductive Wellness consult and review what is in the boarding pass.    Dr. Ivis Castor is MARY Attending.    Kate Garcia  06/17/2024  2:27 PM

## 2024-08-21 ENCOUNTER — APPOINTMENT (OUTPATIENT)
Dept: BEHAVIORAL HEALTH | Facility: CLINIC | Age: 39
End: 2024-08-21
Payer: COMMERCIAL

## 2024-08-21 DIAGNOSIS — Z31.69 INFERTILITY COUNSELING: ICD-10-CM

## 2024-08-21 DIAGNOSIS — F41.8 ANXIETY ABOUT HEALTH: Primary | ICD-10-CM

## 2024-08-21 PROCEDURE — 90791 PSYCH DIAGNOSTIC EVALUATION: CPT | Performed by: PSYCHOLOGIST

## 2024-08-21 PROCEDURE — 3048F LDL-C <100 MG/DL: CPT | Performed by: PSYCHOLOGIST

## 2024-08-21 NOTE — LETTER
August 21, 2024     Kate Garcia, APRN-CNP  [address]  [facility] [address]    Patient: Danyelle Cano   YOB: 1985   Date of Visit: 8/21/2024       Dear Dr. Kate Garcia, APRN-CNP:    Thank you for referring Danyelle Cano to me for evaluation. Below are my notes for this consultation.  If you have questions, please do not hesitate to call me. I look forward to following your patient along with you.       Sincerely,     Niru Carmichael, PhD      CC: Kathie Magdaleno, APRN-CNP  Katerina Andres RN  ______________________________________________________________________________________    Psychosocial Consultation for Third Party Reproduction  Virtual visit :patients in their home    No tobacco, SI, falls    On August 21, 2024 I met virtually with Danyelle and Adan Cano who are requesting IVF/ICSI using aonymous donor oocytes .  Relevant History  Danyelle, 39 and Adan, 40, have been together 12 years and  for 8.  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  They have been trying to conceive for 7 years including an unsuccessful IVF cycle last year (1 viable embryo that did not take).  Danyelle has PCOS and endoemetriosis andAdan has poor morphology.  The couple are now ready to pursue family building using donor oocytes. They have not yet selected a donor but will look for someone that is appropriate after Adan's genetic screen and someone phenotypically similar to her and is educated. They have appropriately processed their sadness about not using Danyelle's gene's but are clear that parenting is their priority.  Danyelle has always wanted to be a mom.  Both deny any psychiatric history, substance abuse or history of physical or sexual abuse.  We discussed the importance of disclosure at a young age and I showed them books for young children on the topic.  Impression: it is my clinical opinion that Danyelle and Adan Cano are able to give informed consent and have considered  the psychosocial issues inherent I this third party reproductive option.

## 2024-08-21 NOTE — PROGRESS NOTES
Psychosocial Consultation for Third Party Reproduction  Virtual visit :patients in their home    No tobacco, SI, falls    On August 21, 2024 I met virtually with Danyelle and Adan Cano who are requesting IVF/ICSI using aonymous donor oocytes .  Relevant History  Danyelle, 39 and Adan, 40, have been together 12 years and  for 8.  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  They have been trying to conceive for 7 years including an unsuccessful IVF cycle last year (1 viable embryo that did not take).  Danyelle has PCOS and endoemetriosis andAdan has poor morphology.  The couple are now ready to pursue family building using donor oocytes. They have not yet selected a donor but will look for someone that is appropriate after Adan's genetic screen and someone phenotypically similar to her and is educated. They have appropriately processed their sadness about not using Danyelle's gene's but are clear that parenting is their priority.  Danyelle has always wanted to be a mom.  Both deny any psychiatric history, substance abuse or history of physical or sexual abuse.  We discussed the importance of disclosure at a young age and I showed them books for young children on the topic.  Impression: it is my clinical opinion that Danyelle and Adan Cano are able to give informed consent and have considered the psychosocial issues inherent I this third party reproductive option.

## 2024-09-10 ENCOUNTER — PREP FOR PROCEDURE (OUTPATIENT)
Dept: ENDOCRINOLOGY | Facility: CLINIC | Age: 39
End: 2024-09-10
Payer: COMMERCIAL

## 2024-09-10 RX ORDER — KETOROLAC TROMETHAMINE 30 MG/ML
30 INJECTION, SOLUTION INTRAMUSCULAR; INTRAVENOUS ONCE AS NEEDED
Status: CANCELLED | OUTPATIENT
Start: 2024-09-10 | End: 2024-09-15

## 2024-09-10 RX ORDER — ACETAMINOPHEN 325 MG/1
650 TABLET ORAL ONCE AS NEEDED
Status: CANCELLED | OUTPATIENT
Start: 2024-09-10

## 2024-09-12 ENCOUNTER — HOSPITAL ENCOUNTER (OUTPATIENT)
Dept: ENDOCRINOLOGY | Facility: CLINIC | Age: 39
Discharge: HOME | End: 2024-09-12
Payer: COMMERCIAL

## 2024-09-12 VITALS
HEIGHT: 67 IN | SYSTOLIC BLOOD PRESSURE: 134 MMHG | RESPIRATION RATE: 18 BRPM | TEMPERATURE: 98.4 F | HEART RATE: 96 BPM | WEIGHT: 237.44 LBS | OXYGEN SATURATION: 99 % | DIASTOLIC BLOOD PRESSURE: 84 MMHG | BODY MASS INDEX: 37.27 KG/M2

## 2024-09-12 DIAGNOSIS — N84.0 ENDOMETRIAL POLYP: Primary | ICD-10-CM

## 2024-09-12 DIAGNOSIS — Z31.41 FERTILITY TESTING: ICD-10-CM

## 2024-09-12 DIAGNOSIS — Z01.812 ENCOUNTER FOR PREPROCEDURAL LABORATORY EXAMINATION: ICD-10-CM

## 2024-09-12 LAB — PREGNANCY TEST URINE, POC: NEGATIVE

## 2024-09-12 PROCEDURE — 64435 NJX AA&/STRD PARACRV NRV: CPT | Performed by: STUDENT IN AN ORGANIZED HEALTH CARE EDUCATION/TRAINING PROGRAM

## 2024-09-12 PROCEDURE — 7100000010 HC PHASE TWO TIME - EACH INCREMENTAL 1 MINUTE

## 2024-09-12 PROCEDURE — 7100000009 HC PHASE TWO TIME - INITIAL BASE CHARGE

## 2024-09-12 PROCEDURE — 58555 HYSTEROSCOPY DX SEP PROC: CPT | Performed by: STUDENT IN AN ORGANIZED HEALTH CARE EDUCATION/TRAINING PROGRAM

## 2024-09-12 RX ORDER — KETOROLAC TROMETHAMINE 30 MG/ML
30 INJECTION, SOLUTION INTRAMUSCULAR; INTRAVENOUS ONCE AS NEEDED
Status: DISCONTINUED | OUTPATIENT
Start: 2024-09-12 | End: 2024-09-13 | Stop reason: HOSPADM

## 2024-09-12 RX ORDER — ACETAMINOPHEN 325 MG/1
650 TABLET ORAL ONCE AS NEEDED
Status: DISCONTINUED | OUTPATIENT
Start: 2024-09-12 | End: 2024-09-13 | Stop reason: HOSPADM

## 2024-09-12 NOTE — PROGRESS NOTES
Patient ID: Danyelle Cano is a 39 y.o. female.    Hysteroscopy diagnostic    Date/Time: 9/12/2024 2:19 PM    Performed by: Gloria Sosa MD  Authorized by: ELIZABETH Boucher-CNP    Consent:     Consent obtained:  Verbal and written    Consent given by:  Patient    Risks, benefits, and alternatives were discussed: yes      Risks discussed:  Bleeding, infection and pain  Universal protocol:     Procedure explained and questions answered to patient or proxy's satisfaction: yes      Relevant documents present and verified: yes      Test results available: yes      Imaging studies available: yes      Required blood products, implants, devices, and special equipment available: yes      Immediately prior to procedure, a time out was called: yes      Patient identity confirmed:  Verbally with patient, arm band and hospital-assigned identification number  Pre-procedure details:     Skin preparation:  Povidone-iodine  Procedure specific details:      Procedure: Diagnostic Hysteroscopy   Preop diagnosis: IVF  Post op diagnosis: Same, and polyp near left ostia  Assistant: none    Anesthesia: None   IV: None   EBL: 3 cc  Specimens: None   Complications: None   Risks benefits and alternatives of the procedure explained to the patient and informed consent was obtained. Urine pregnancy test was performed and was negative. Time out was performed. The patient was placed in the dorsal lithotomy position and a sterile speculum was placed in the vagina. The cervix was sterilized with Betadine x3. Paracervical block with lidocaine 1% was administered.   Tenaculum: No  Dilation: No  The hysteroscope was placed in the cervix and advanced into the uterine cavity. Normal saline was used for distension media. Images were obtained and findings noted as below.   All instruments were then removed. Good hemostasis was noted. Patient tolerated the procedure well returned to the recovery area in stable condition. .   Findings:   Cavity:  Normal endometrial cavity, polypoid lesion noted on the anterior uterine wall near the left tubal ostia  Ostia: Bilateral tubal ostia visualized  Additional Notes: Recommend hysteroscopic polypectomy (may be without anesthesia as patient tolerated well), order placed    Gloria Sosa 09/12/24 2:20 PM         Post-procedure details:     Procedure completion:  Tolerated well, no immediate complications

## 2024-09-13 ENCOUNTER — TELEPHONE (OUTPATIENT)
Dept: ENDOCRINOLOGY | Facility: CLINIC | Age: 39
End: 2024-09-13
Payer: COMMERCIAL

## 2024-09-13 NOTE — TELEPHONE ENCOUNTER
Called the patient she verified her name and date of birth I advised I would call her 24-28 hours before her scheduled procedure with a date and time I advised I would add 9-25 to the list of her availability Patient verbalized an understanding    SANA RODRIGUEZ 09/13/24 3:38 PM

## 2024-09-13 NOTE — TELEPHONE ENCOUNTER
Reason for call: Patient is calling to schedule a polypectomy since she is on birth control nurse told her she can get scheduled any time. Please call her.  Notes:

## 2024-09-24 ENCOUNTER — TELEPHONE (OUTPATIENT)
Dept: ENDOCRINOLOGY | Facility: CLINIC | Age: 39
End: 2024-09-24

## 2024-09-24 NOTE — TELEPHONE ENCOUNTER
Called the patient she verified her name and date of birth I advised the polypectomy is with anesthesia I advised we have no openings this week Patient verbalized an understanding    SANA RODRIGUEZ 09/24/24 11:43 AM

## 2024-09-24 NOTE — TELEPHONE ENCOUNTER
Caller: Danyelle  Reason for call: Returning nurse Maria Fernanda PRICE call from yesterday  Notes:   Calling to get scheduled for polypectomy

## 2024-10-01 ENCOUNTER — PREP FOR PROCEDURE (OUTPATIENT)
Dept: ENDOCRINOLOGY | Facility: CLINIC | Age: 39
End: 2024-10-01
Payer: COMMERCIAL

## 2024-10-01 RX ORDER — HYDROMORPHONE HYDROCHLORIDE 0.2 MG/ML
0.2 INJECTION INTRAMUSCULAR; INTRAVENOUS; SUBCUTANEOUS
Status: CANCELLED | OUTPATIENT
Start: 2024-10-01

## 2024-10-01 RX ORDER — ACETAMINOPHEN 325 MG/1
650 TABLET ORAL ONCE AS NEEDED
Status: CANCELLED | OUTPATIENT
Start: 2024-10-01

## 2024-10-01 RX ORDER — KETOROLAC TROMETHAMINE 30 MG/ML
30 INJECTION, SOLUTION INTRAMUSCULAR; INTRAVENOUS ONCE AS NEEDED
Status: CANCELLED | OUTPATIENT
Start: 2024-10-01 | End: 2024-10-06

## 2024-10-01 RX ORDER — ONDANSETRON HYDROCHLORIDE 2 MG/ML
4 INJECTION, SOLUTION INTRAVENOUS AS NEEDED
Status: CANCELLED | OUTPATIENT
Start: 2024-10-01

## 2024-10-01 RX ORDER — HYDROCODONE BITARTRATE AND ACETAMINOPHEN 5; 325 MG/1; MG/1
1 TABLET ORAL ONCE AS NEEDED
Status: CANCELLED | OUTPATIENT
Start: 2024-10-01

## 2024-10-01 RX ORDER — KETOROLAC TROMETHAMINE 30 MG/ML
30 INJECTION, SOLUTION INTRAMUSCULAR; INTRAVENOUS ONCE
Status: CANCELLED | OUTPATIENT
Start: 2024-10-01 | End: 2024-10-01

## 2024-10-01 RX ORDER — OXYCODONE AND ACETAMINOPHEN 5; 325 MG/1; MG/1
1 TABLET ORAL EVERY 6 HOURS PRN
Status: CANCELLED | OUTPATIENT
Start: 2024-10-01

## 2024-10-01 RX ORDER — MORPHINE SULFATE 2 MG/ML
2 INJECTION, SOLUTION INTRAMUSCULAR; INTRAVENOUS AS NEEDED
Status: CANCELLED | OUTPATIENT
Start: 2024-10-01

## 2024-10-02 ENCOUNTER — ANESTHESIA (OUTPATIENT)
Dept: ENDOCRINOLOGY | Facility: CLINIC | Age: 39
End: 2024-10-02
Payer: COMMERCIAL

## 2024-10-02 ENCOUNTER — ANESTHESIA EVENT (OUTPATIENT)
Dept: ENDOCRINOLOGY | Facility: CLINIC | Age: 39
End: 2024-10-02
Payer: COMMERCIAL

## 2024-10-02 ENCOUNTER — HOSPITAL ENCOUNTER (OUTPATIENT)
Dept: ENDOCRINOLOGY | Facility: CLINIC | Age: 39
Discharge: HOME | End: 2024-10-02
Payer: COMMERCIAL

## 2024-10-02 ENCOUNTER — PREP FOR PROCEDURE (OUTPATIENT)
Dept: OBSTETRICS AND GYNECOLOGY | Facility: HOSPITAL | Age: 39
End: 2024-10-02
Payer: COMMERCIAL

## 2024-10-02 VITALS
BODY MASS INDEX: 37.3 KG/M2 | RESPIRATION RATE: 16 BRPM | HEART RATE: 83 BPM | TEMPERATURE: 97.7 F | DIASTOLIC BLOOD PRESSURE: 83 MMHG | WEIGHT: 237.66 LBS | HEIGHT: 67 IN | SYSTOLIC BLOOD PRESSURE: 126 MMHG | OXYGEN SATURATION: 100 %

## 2024-10-02 DIAGNOSIS — N84.0 ENDOMETRIAL POLYP: ICD-10-CM

## 2024-10-02 LAB — PREGNANCY TEST URINE, POC: NEGATIVE

## 2024-10-02 PROCEDURE — 88305 TISSUE EXAM BY PATHOLOGIST: CPT

## 2024-10-02 PROCEDURE — A58558 PR HYSTEROSCOPY,W/ENDO BX

## 2024-10-02 PROCEDURE — 58558 HYSTEROSCOPY BIOPSY: CPT | Performed by: OBSTETRICS & GYNECOLOGY

## 2024-10-02 PROCEDURE — 2500000004 HC RX 250 GENERAL PHARMACY W/ HCPCS (ALT 636 FOR OP/ED)

## 2024-10-02 PROCEDURE — 3700000002 HC GENERAL ANESTHESIA TIME - EACH INCREMENTAL 1 MINUTE: Performed by: OBSTETRICS & GYNECOLOGY

## 2024-10-02 PROCEDURE — 3700000001 HC GENERAL ANESTHESIA TIME - INITIAL BASE CHARGE: Performed by: OBSTETRICS & GYNECOLOGY

## 2024-10-02 PROCEDURE — 7100000010 HC PHASE TWO TIME - EACH INCREMENTAL 1 MINUTE: Performed by: OBSTETRICS & GYNECOLOGY

## 2024-10-02 PROCEDURE — A58558 PR HYSTEROSCOPY,W/ENDO BX: Performed by: ANESTHESIOLOGY

## 2024-10-02 PROCEDURE — 7100000009 HC PHASE TWO TIME - INITIAL BASE CHARGE: Performed by: OBSTETRICS & GYNECOLOGY

## 2024-10-02 RX ORDER — KETOROLAC TROMETHAMINE 30 MG/ML
30 INJECTION, SOLUTION INTRAMUSCULAR; INTRAVENOUS ONCE AS NEEDED
Status: DISCONTINUED | OUTPATIENT
Start: 2024-10-02 | End: 2024-10-03 | Stop reason: HOSPADM

## 2024-10-02 RX ORDER — ONDANSETRON HYDROCHLORIDE 2 MG/ML
INJECTION, SOLUTION INTRAVENOUS AS NEEDED
Status: DISCONTINUED | OUTPATIENT
Start: 2024-10-02 | End: 2024-10-02

## 2024-10-02 RX ORDER — KETOROLAC TROMETHAMINE 30 MG/ML
30 INJECTION, SOLUTION INTRAMUSCULAR; INTRAVENOUS ONCE
Status: DISCONTINUED | OUTPATIENT
Start: 2024-10-02 | End: 2024-10-03 | Stop reason: HOSPADM

## 2024-10-02 RX ORDER — MIDAZOLAM HYDROCHLORIDE 1 MG/ML
INJECTION, SOLUTION INTRAMUSCULAR; INTRAVENOUS AS NEEDED
Status: DISCONTINUED | OUTPATIENT
Start: 2024-10-02 | End: 2024-10-02

## 2024-10-02 RX ORDER — PROPOFOL 10 MG/ML
INJECTION, EMULSION INTRAVENOUS CONTINUOUS PRN
Status: DISCONTINUED | OUTPATIENT
Start: 2024-10-02 | End: 2024-10-02

## 2024-10-02 RX ORDER — FENTANYL CITRATE 50 UG/ML
INJECTION, SOLUTION INTRAMUSCULAR; INTRAVENOUS AS NEEDED
Status: DISCONTINUED | OUTPATIENT
Start: 2024-10-02 | End: 2024-10-02

## 2024-10-02 RX ORDER — GLYCOPYRROLATE 0.2 MG/ML
INJECTION INTRAMUSCULAR; INTRAVENOUS AS NEEDED
Status: DISCONTINUED | OUTPATIENT
Start: 2024-10-02 | End: 2024-10-02

## 2024-10-02 RX ORDER — HYDROMORPHONE HYDROCHLORIDE 2 MG/ML
0.2 INJECTION, SOLUTION INTRAMUSCULAR; INTRAVENOUS; SUBCUTANEOUS
Status: DISCONTINUED | OUTPATIENT
Start: 2024-10-02 | End: 2024-10-03 | Stop reason: HOSPADM

## 2024-10-02 RX ORDER — ONDANSETRON HYDROCHLORIDE 2 MG/ML
4 INJECTION, SOLUTION INTRAVENOUS AS NEEDED
Status: DISCONTINUED | OUTPATIENT
Start: 2024-10-02 | End: 2024-10-03 | Stop reason: HOSPADM

## 2024-10-02 RX ORDER — OXYCODONE AND ACETAMINOPHEN 5; 325 MG/1; MG/1
1 TABLET ORAL EVERY 6 HOURS PRN
Status: DISCONTINUED | OUTPATIENT
Start: 2024-10-02 | End: 2024-10-03 | Stop reason: HOSPADM

## 2024-10-02 RX ORDER — MORPHINE SULFATE 2 MG/ML
2 INJECTION, SOLUTION INTRAMUSCULAR; INTRAVENOUS AS NEEDED
Status: DISCONTINUED | OUTPATIENT
Start: 2024-10-02 | End: 2024-10-03 | Stop reason: HOSPADM

## 2024-10-02 RX ORDER — SODIUM CHLORIDE, SODIUM LACTATE, POTASSIUM CHLORIDE, CALCIUM CHLORIDE 600; 310; 30; 20 MG/100ML; MG/100ML; MG/100ML; MG/100ML
INJECTION, SOLUTION INTRAVENOUS CONTINUOUS PRN
Status: DISCONTINUED | OUTPATIENT
Start: 2024-10-02 | End: 2024-10-02

## 2024-10-02 RX ORDER — HYDROCODONE BITARTRATE AND ACETAMINOPHEN 5; 325 MG/1; MG/1
1 TABLET ORAL ONCE AS NEEDED
Status: DISCONTINUED | OUTPATIENT
Start: 2024-10-02 | End: 2024-10-03 | Stop reason: HOSPADM

## 2024-10-02 RX ORDER — ACETAMINOPHEN 325 MG/1
650 TABLET ORAL ONCE AS NEEDED
Status: DISCONTINUED | OUTPATIENT
Start: 2024-10-02 | End: 2024-10-03 | Stop reason: HOSPADM

## 2024-10-02 RX ORDER — SEMAGLUTIDE 2.68 MG/ML
2 INJECTION, SOLUTION SUBCUTANEOUS
COMMUNITY

## 2024-10-02 RX ORDER — KETOROLAC TROMETHAMINE 30 MG/ML
INJECTION, SOLUTION INTRAMUSCULAR; INTRAVENOUS AS NEEDED
Status: DISCONTINUED | OUTPATIENT
Start: 2024-10-02 | End: 2024-10-02

## 2024-10-02 ASSESSMENT — PAIN SCALES - GENERAL
PAINLEVEL_OUTOF10: 3
PAINLEVEL_OUTOF10: 3
PAINLEVEL_OUTOF10: 0 - NO PAIN
PAINLEVEL_OUTOF10: 1

## 2024-10-02 ASSESSMENT — PAIN - FUNCTIONAL ASSESSMENT
PAIN_FUNCTIONAL_ASSESSMENT: 0-10
PAIN_FUNCTIONAL_ASSESSMENT: WONG-BAKER FACES
PAIN_FUNCTIONAL_ASSESSMENT: 0-10
PAIN_FUNCTIONAL_ASSESSMENT: 0-10

## 2024-10-02 ASSESSMENT — COLUMBIA-SUICIDE SEVERITY RATING SCALE - C-SSRS
1. IN THE PAST MONTH, HAVE YOU WISHED YOU WERE DEAD OR WISHED YOU COULD GO TO SLEEP AND NOT WAKE UP?: NO
2. HAVE YOU ACTUALLY HAD ANY THOUGHTS OF KILLING YOURSELF?: NO
6. HAVE YOU EVER DONE ANYTHING, STARTED TO DO ANYTHING, OR PREPARED TO DO ANYTHING TO END YOUR LIFE?: NO

## 2024-10-02 ASSESSMENT — PAIN DESCRIPTION - DESCRIPTORS
DESCRIPTORS: ACHING
DESCRIPTORS: ACHING
DESCRIPTORS: CRAMPING

## 2024-10-02 NOTE — ANESTHESIA POSTPROCEDURE EVALUATION
Patient: Danyelle Cano    Procedure Summary       Date: 10/02/24 [place] [facility]  [place]    Anesthesia Start: 0922 Anesthesia Stop: 0946    Procedure: POLYPECTOMY Diagnosis: Endometrial polyp    Scheduled Providers: Ivis Castro MD; Harjinder Ventura MD; YAHIR Staley Responsible Provider: Harjinder Ventura MD    Anesthesia Type: MAC ASA Status: 2            Anesthesia Type: MAC    Vitals Value Taken Time   /83 10/02/24 1015   Temp 36.5 °C (97.7 °F) 10/02/24 0945   Pulse 83 10/02/24 1015   Resp 16 10/02/24 1015   SpO2 100 % 10/02/24 1015       Anesthesia Post Evaluation    Patient location during evaluation: PACU  Patient participation: complete - patient participated  Level of consciousness: awake and alert  Pain management: adequate  Airway patency: patent  Cardiovascular status: acceptable and hemodynamically stable  Respiratory status: acceptable, spontaneous ventilation and nonlabored ventilation  Hydration status: acceptable  Postoperative Nausea and Vomiting: none        There were no known notable events for this encounter.

## 2024-10-02 NOTE — PROGRESS NOTES
Patient ID: Danyelle Cano is a 39 y.o. female.    Polypectomy    Date/Time: 10/2/2024 9:39 AM    Performed by: Ivis Castro MD  Authorized by: Gloria Sosa MD    Consent:     Consent obtained:  Written and verbal    Consent given by:  Patient    Risks, benefits, and alternatives were discussed: yes      Risks discussed:  Bleeding, infection and pain    Alternatives discussed:  No treatment  Universal protocol:     Procedure explained and questions answered to patient or proxy's satisfaction: yes      Relevant documents present and verified: yes      Test results available: yes      Imaging studies available: yes      Immediately prior to procedure, a time out was called: yes      Patient identity confirmed:  Verbally with patient and arm band  Pre-procedure details:     Skin preparation:  Povidone-iodine    Preparation: Patient was prepped and draped in the usual sterile fashion    Procedure specific details:      Preoperative Diagnosis: endometrial polyp  Postoperative Diagnosis: Same  Assistant: None       Procedure: Hysteroscopic polypectomy    Discussed risks, benefits, alternatives and potential complications of surgical management. Written Informed Consent was obtained prior to the procedure and is detailed in the patient's record. Urine pregnancy test was performed and was negative. Time out was performed.    Anesthesia: MAC  Paracervical block with 1% lidocaine: No  Tenaculum: Yes  Cervical dilation: Yes  Estimated Blood Loss: 5 cc  Specimens: endometrial polyp  IV Fluids: 300 cc   Hysteroscopic fluid deficit: <100 cc     Findings: Two endometrial polyps noted on anterior left fundus and Overall thickened endometrium; normal contour of cavity with bilateral ostia well visualized.       PROCEDURE DETAILS:   Patient was taken to the operating room.  She was prepped and draped in the usual sterile fashion in dorsal lithotomy position. A speculum was placed within the vaginal canal and the cervix was  visualized. Betadine swabs x 3 were used to cleanse the cervix. A single tooth tenaculum was placed on the anterior lip of the cervix. The cervix was serially dilated.  The Aveta hysteroscope was then inserted into the uterine cavity under direct visualization using normal saline as a distension medium. Once entry into the uterine cavity was completed, the above findings were visualized. The resection device was used to the resect the tissue until the uterus appeared smooth with no residual pathology.   Additional procedure details:     Excellent hemostasis was then noted. The hysteroscope was then removed from the uterine cavity without complications. Tenaculum and speculum were removed. Patient tolerated the procedure well and was returned to the recovery room in stable condition.  All counts were correct x 2. The attending physician was present for the entire procedure.    Ivis Castro 10/02/24 9:39 AM          Post-procedure details:     Procedure completion:  Tolerated well, no immediate complications

## 2024-10-02 NOTE — DISCHARGE INSTRUCTIONS
[facility]  [address]  [address]  Tel: [phone]   Fax: [phone]    Home Going Instructions after Polypectomy:    Activity:   We do not recommend any exercise on the day of your polypectomy.  You may return to work the following day.  You may have light bleeding or spotting for several days after polypectomy.   Use only sanitary pads for bleeding, do not use tampons until you have no further bleeding.   Please abstain from intercourse until you have no further bleeding.     Anesthesia:  Drink small amounts of liquids initially and then slowly increase your intake of food. Drinking fluids will keep your bowels regular.   Avoid foods that are sweet, spicy or hard to digest today.  If you feel nauseated, rest your stomach for one hour, and then try drinking clear liquids again.  You may take a stool softener or miralax/milk of magnesia to help with constipation that may occur after anesthesia.  Please make sure a responsible adult is with you for at least 24 hours after surgery and do not drive or make important decisions during this time. Anesthesia may affect your judgment, coordination, and reaction time.    Pain:  If you experience any post-op pain, pain can be managed by taking Ibuprofen and/or Tylenol.    Follow up:  Follow up with your primary nurse a few days after your procedure to check in and make sure you know what your next steps are.   A post-operative visit with your physician is not necessary after polypectomy.    When to call your provider:  Vaginal bleeding that is more than a normal period that doesn't taper down.  Bleeding through 1 sanitary pad an hour.  Any clots the size of an egg or bigger.  Fever of 100.4 or higher with chills.  Unusual or foul smelling discharge.  Worsening abdominal pain.    Violeta Hernandez    8:36 AM

## 2024-10-02 NOTE — ANESTHESIA PREPROCEDURE EVALUATION
Patient: Danyelle Cano    Procedure Information       Date/Time: 10/02/24 0930    Scheduled providers: Ivis Castro MD; Harjinder Ventura MD; YAHIR Staley    Procedure: POLYPECTOMY    [facility]            Relevant Problems   Cardiac   (+) Chronic hypertension      Endocrine   (+) Class 2 obesity with body mass index (BMI) of 35.0 to 35.9 in adult      GYN   (+) Endometriosis   (+) PCOS (polycystic ovarian syndrome)       Clinical information reviewed:   Tobacco  Allergies  Meds   Med Hx  Surg Hx   Fam Hx  Soc Hx         Past Medical History:   Diagnosis Date    Asthma     Diabetes mellitus (Multi)     Endometriosis     GERD (gastroesophageal reflux disease)     Migraine headache with aura     ANGELICA (obstructive sleep apnea)     PCOS (polycystic ovarian syndrome)       Past Surgical History:   Procedure Laterality Date    DILATION AND CURETTAGE OF UTERUS      HYSTEROSCOPY      NASAL SEPTUM SURGERY      PELVIC LAPAROSCOPY      POLYPECTOMY  10/02/2024    TONSILLECTOMY       Social History     Tobacco Use    Smoking status: Never     Passive exposure: Never    Smokeless tobacco: Never   Substance Use Topics    Alcohol use: Yes    Drug use: Never      Current Outpatient Medications   Medication Instructions    acetaminophen (Tylenol Arthritis Pain) 650 mg ER tablet Tylenol 8 Hour 650 MG Oral Tablet Extended Release   Refills: 0        Start : 6-Oct-2022   Active    albuterol 90 mcg/actuation inhaler 1-2 puffs, inhalation,  INHALE 1 TO 2 PUFFS BY MOUTH EVERY 4 TO 6 HOURS AS NEEDED    cholecalciferol (Vitamin D-3) 25 MCG (1000 UT) capsule Vitamin D-3 25 MCG (1000 UT) Oral Capsule   Refills: 0        Start : 1-Aug-2022   Active    Cinnamon 500 mg capsule Cinnamon 500 MG Oral Capsule   Refills: 0        Start : 1-Aug-2022   Active    coenzyme Q10 400 mg capsule CoQ10 400 MG Oral Capsule   Refills: 0        Start : 1-Aug-2022   Active     "loratadine-pseudoephedrine (Claritin-D 12 Hour) 5-120 mg 12 hr tablet Claritin-D 12 Hour 5-120 MG Oral Tablet Extended Release 12 Hour   Refills: 0        Start : 1-Aug-2022   Active    metFORMIN XR (Glucophage-XR) 750 mg 24 hr tablet 1 tablet, oral, 2 times daily    norethindrone (Aygestin) 5 mg tablet 1 tablet, oral, Daily    Ozempic 2 mg, subcutaneous, Every 7 days    -iron fum-folic acid (Prenatal 19) 29 mg iron- 1 mg tablet Prenatal 19 Oral Tablet   Refills: 0        Start : 1-Aug-2022   Active    topiramate (TOPAMAX SPRINKLE) 25 mg, oral, 2 times daily, Do not crush or chew.      Allergies   Allergen Reactions    Watermelon Anaphylaxis    Penicillins Unknown        Chemistry    Lab Results   Component Value Date/Time     04/17/2024 1256    K 3.8 04/17/2024 1256     04/17/2024 1256    CO2 25 04/17/2024 1256    BUN 12 04/17/2024 1256    CREATININE 0.75 04/17/2024 1256    Lab Results   Component Value Date/Time    CALCIUM 8.7 04/17/2024 1256    ALKPHOS 66 04/17/2024 1256    AST 10 04/17/2024 1256    ALT 14 04/17/2024 1256    BILITOT 0.3 04/17/2024 1256          Lab Results   Component Value Date    HGBA1C 7.7 (A) 08/13/2022     Lab Results   Component Value Date/Time    WBC 10.0 04/17/2024 1256    HGB 11.7 (L) 04/17/2024 1256    HCT 37.0 04/17/2024 1256     04/17/2024 1256     No results found for: \"PROTIME\", \"PTT\", \"INR\"  No results found for: \"ABORH\"  No results found for this or any previous visit (from the past 4464 hour(s)).  No results found for this or any previous visit from the past 1095 days.       Visit Vitals  /77   Pulse 80   Temp 36.8 °C (98.2 °F) (Oral)   Resp 18   Ht 1.702 m (5' 7\")   Wt 108 kg (237 lb 10.5 oz)   LMP 09/05/2024 (Exact Date)   SpO2 98%   BMI 37.22 kg/m²   OB Status Having periods   Smoking Status Never   BSA 2.26 m²     NPO/Void Status  Date of Last Liquid: 10/01/24  Time of Last Liquid: 2130  Date of Last Solid: 10/01/24  Time of Last Solid: " 2130        Physical Exam    Airway  Mallampati: II  TM distance: >3 FB  Neck ROM: full     Cardiovascular - normal exam  Rhythm: regular  Rate: normal     Dental    Pulmonary - normal exam     Abdominal - normal exam             Anesthesia Plan    History of general anesthesia?: yes  History of complications of general anesthesia?: no    ASA 2     MAC   (With standard ASA monitoring.)  intravenous induction   Anesthetic plan and risks discussed with patient.    Plan discussed with CRNA and CAA.

## 2024-10-03 ENCOUNTER — ALLIED HEALTH (OUTPATIENT)
Dept: GENETICS | Facility: CLINIC | Age: 39
End: 2024-10-03
Payer: COMMERCIAL

## 2024-10-03 VITALS
HEIGHT: 67 IN | BODY MASS INDEX: 37.67 KG/M2 | WEIGHT: 240 LBS | DIASTOLIC BLOOD PRESSURE: 71 MMHG | HEART RATE: 71 BPM | SYSTOLIC BLOOD PRESSURE: 107 MMHG

## 2024-10-03 DIAGNOSIS — Z31.5 ENCOUNTER FOR PROCREATIVE GENETIC COUNSELING: ICD-10-CM

## 2024-10-03 DIAGNOSIS — Z31.69 ENCOUNTER FOR PRECONCEPTION CONSULTATION: ICD-10-CM

## 2024-10-03 PROCEDURE — 96040 HC GENETIC COUNSELING, EACH 30 MIN: CPT

## 2024-10-03 PROCEDURE — 96040 PR MEDICAL GENETICS COUNSELING EACH 30 MINUTES: CPT

## 2024-10-03 ASSESSMENT — PAIN SCALES - GENERAL: PAINLEVEL: 2

## 2024-10-03 NOTE — PROGRESS NOTES
Thank you for the referral of Ms. Danyelle Cano ( 1985) and Mr. Sancho Cano (1984). Danyelle PEPE is a 39 y.o.  female, and Sancho a 40 year old male, and the couple was seen for genetic counseling due to desired use of a gamete donor in future assisted reproductive technology cycles. The couple requested this conversation to be documented together.     PAST HISTORY:   The couple has one prior pregnancy together conceived with IVF. The IVF cycle only resulted in one embryo, and no preimplantation genetic testing was done. A viability scan during that pregnancy identified a missed ab, and product of conception testing was sent through [facility].      Danyelle PEPE's medical history is concerning for type 2 diabetes, fibromyalgia, degenerative disc disease, endometriosis/uterine polyps, and PCOS.   The couple plans to proceed with another round of in vitro fertilization with the use of an anonymous egg donor. They have selected a potential egg donor from [facility] who had previous carrier screening:      FAMILY HISTORY  Medical and family histories were reviewed and the following concerns were apparent:  Danyelle Cano:  -Brother (living, 44) has hypertension.   -Mother (living, 60s) has type 2 diabetes, and had a hysterectomy in her 20s due to abnormal uterine bleeding.   -Father (living, 60s) has a history of a stroke and afib.   -2 maternal first cousins both have MTHFR mutations. Both of these cousins have had several miscarriages.  -Paternal first cousin once removed (son of paternal first cousin, living, infant) was born with an isolated cleft lip.   -Paternal aunt had 7 miscarriages and was never able to have healthy children.     Sancho Cano:  -Mother (living, 79) has gout, hypertension, and hypothyroidism. She had a full term stillborn pregnancy and the couple does not have additional information on the features of the fetus or suspected cause of the stillbirth.   -Several maternal aunts with  type 2 diabetes, and one maternal first cousin with juvenile diabetes.   -Father (, 77)  of metastatic prostate cancer, as did his brother (patient's paternal uncle).    Potential [place] [facility] Donor:  -Mother (living) had colon cancer in her late 50s, and paternal grandfather () was diagnosed with colon cancer over the age of 75.     The remainder of the family history was negative for birth defects, intellectual disability, recurrent pregnancy loss, or recognized inherited conditions. Consanguinity denied.    REPORTED ETHNICITY/RACE:  Patient: English/[place]an/  Patient's partner: Iraqi     COUNSELING:    The following information was discussed with your patient:    Per updated ACOG recommendations from 2017, we discussed the availability, benefits, and limitations of carrier screening for cystic fibrosis (CF), spinal muscular atrophy (SMA), and hemoglobinopathies/thalassemias. We reviewed the carrier frequencies of these conditions, varied clinical manifestations, and their autosomal recessive inheritance. The patient has already had negative carrier screening for hemoglobinopathies and thalassemias via CBC and hemoglobin electrophoresis and these results were reviewed.  screening is available for CF, hemoglobinopathies, and thalassemias, and SMA.    We also discussed the availability of more expanded/pan ethnic carrier screening for additional, primarily autosomal recessive, conditions. We discussed the pros and cons of expanded carrier screening including the higher likelihood of being identified as a carrier for at least one condition on a larger panel. There are different carrier screening panels available ranging from 3 of the most common genetic risk factors, to ~175 primarily autosomal recessive/X-linked conditions, to 600+ disorders. Range of clinical features that may be associated with conditions screened for were reviewed. If both gamete contributors to a  pregnancy are found to be carriers for the same condition, there is a 25% chance for an affected child and prenatal diagnosis would be available. Approximately 2-4% of biological parents are found to be at risk to have a child with a genetic disorder based on this screening. In rare cases, expanded carrier screening results may have health implications for the tested individual.    When utilizing an anonymous donor for conception, intended parents must use an approved bank for donor selection after review of donor profiles. Per  [facility] Policy any donor must be approved prior to oocytes being purchased and utilized for conception. Included in the donor profile(s) is any genetic screening that the donor may have had. The most common form of screening is expanded carrier screening, in which the donor is screened for a number of autosomal recessive and/or X-linked genetic conditions and determined to be a carrier or unlikely to be a carrier of each disorder tested. The specific carrier testing panel (number and types of diseases included) is variable from donor to donor, even when donors are from the same bank. If both gamete sources are carriers of the same autosomal recessive condition, there would be a 25% chance for each conception to be affected with the disorder. In this event, selecting an alternative donor, or performing preimplantation genetic testing for the disorder of concern may be considered.   ABCA4-related conditions. This result means that the selected potential donor is a CARRIER of ABCA4-related conditions, but she  is not expected to be affected with this condition. Based on general population risk, Sancho has a 1 in 45 chance of being a carrier of this condition as well, and therefore there is currently a 1 in 180 risk of having a future affected child. The ABCA4 gene provides instructions for making a protein that is found in the retina, the specialized light-sensitive tissue that lines the back of  the eye. Specifically, the ABCA4 protein is produced in the retina's light-sensing cells (photoreceptors). The ABCA4 protein transports potentially toxic substances that can damage photoreceptors. These substances form after phototransduction, the process by which light entering the eye is converted into electrical signals that are transmitted to the brain. The ABCA4 protein removes one of these substances, called [id number], from photoreceptors. When a child inherits 2 non-working copies of this gene, it can cause different vision problems such as cone-montserrat dystrophy, macular degeneration, and retinitis pigmentosa.   Of note, this report was initially misread and familial hyperinsulinism was discussed. The donor is NOT a carrier of familial hyperinsulinism, as this is caused by mutations in the ABCC8 gene. I will discuss this mis-read with the couple.   GJB2-related conditions. This result means that this potential donor is a CARRIER of GJB2-related conditions, but she  is not expected to be affected with this condition. Based on general population risk, Sancho has a 1 in 30 chance of being a carrier of this condition as well, and therefore there is currently a 1 in 120 risk of having a future affected child. This causes isolated congenital hearing loss, which can vary in severity.   Carrier screening for Sancho is available for the entire panel that the donor had originally, or, for just the 2 conditions that she is positive for. We discussed pros and cons of targeted carrier screening vs. A broader panel. Sancho is agreeable to a panel that covers the 113 genes recommended by the American College of Medical Genetics, including the ABCA4 and GJB2 genes. This will also screen for the most common conditions so that carrier status is known in the case of needing to proceed with a different egg donor.   Additional family history concerns:  Young cancer diagnoses in relatives. When an individual is diagnosed  with metastatic prostate cancer at any age, they meet the National Comprehensive Cancer Network (NCCN) guidelines for testing of cancer predisposition genes. First degree relatives are also eligible for this testing to determine if there is a genetic predisposition or reason for young cancer diagnoses in a family. Sancho may be at increased risk of having a mutation based on his father's history. [facility] accepts self-referrals to the Cancer Genetics clinic at  by calling [phone].   Multifactorial inheritance. We reviewed that conditions such as hypertension, diabetes, hypothyroidism, and strokes are often considered multifactorial, meaning that they are due to some combination of genetic and environmental risk factors. While we do not have specific testing to assess exact risk, we know that relatives of affected individuals have an elevated risk when compared to the general population of being similarly affected. Danyelle PEPE should inform her doctors, as well as any future pediatricians, of this family history to allow for early intervention and to maximize outcomes.   A stillbirth may occur as part of a chromosome abnormality or single gene disorder.  There are many other reasons for stillbirth.  Sometimes the cause of a stillbirth remains unknown. Without further information, we cannot provide a risk to the current pregnancy based on this history.  We discussed that the American College of Medical Genetics (ACMG) no longer recommends testing for MTHRF, as there is a lack of evidence to support previous associations made with changes in this gene. Ms. Cano stated that she is okay with foregoing this testing.     DISPOSITION:  The couple stated that they understood the above information. Sancho elected to proceed with the 113-gene carrier testing through [facility]. Patient was handed a genetic testing kit and sent to the lab for a blood draw today.. Results will take about 3 weeks to return,  at which time results and follow up plan will be discussed.    The couple additionally inquired about CMV testing, which has not yet been done. I will order this testing for both of them today, too.     FOREST Palacios spent 40 minutes with the patient with greater than 50% of the time spent in face to face counseling.     Thank you for allowing us to participate in the care of your patient. Should you or your patient have any questions, please do not hesitate to contact our office at [phone].    Sincerely,   FOREST Palacios   Licensed and Certified Genetic Counselor      Reviewed by:  Dr. Katerina Garcia MD  Reproductive Endocrinology and Infertility

## 2024-10-08 LAB
LABORATORY COMMENT REPORT: NORMAL
PATH REPORT.FINAL DX SPEC: NORMAL
PATH REPORT.GROSS SPEC: NORMAL
PATH REPORT.RELEVANT HX SPEC: NORMAL
PATH REPORT.TOTAL CANCER: NORMAL

## 2024-10-13 ENCOUNTER — APPOINTMENT (OUTPATIENT)
Dept: ENDOCRINOLOGY | Facility: CLINIC | Age: 39
End: 2024-10-13
Payer: COMMERCIAL